# Patient Record
Sex: FEMALE | Race: BLACK OR AFRICAN AMERICAN | NOT HISPANIC OR LATINO | URBAN - METROPOLITAN AREA
[De-identification: names, ages, dates, MRNs, and addresses within clinical notes are randomized per-mention and may not be internally consistent; named-entity substitution may affect disease eponyms.]

---

## 2021-07-31 ENCOUNTER — IMPORTED ENCOUNTER (OUTPATIENT)
Dept: URBAN - METROPOLITAN AREA CLINIC 75 | Facility: CLINIC | Age: 61
End: 2021-07-31

## 2021-07-31 PROCEDURE — 99203 OFFICE O/P NEW LOW 30 MIN: CPT

## 2021-09-19 PROBLEM — H35.411 LATTICE DEGENERATION OF RETINA: Status: WELL-CONTROLLED | Noted: 2021-09-19

## 2021-09-19 PROBLEM — H35.411 LATTICE DEGENERATION OF RETINA: Noted: 2021-09-19

## 2021-09-19 PROBLEM — H25.12 NS CATARACT: Noted: 2021-09-19

## 2021-09-19 PROBLEM — H46.8 TRAUMATIC OPTIC NEUROPATHY: Noted: 2021-09-19

## 2021-09-19 PROBLEM — H43.813 POSTERIOR VITREOUS DETACHMENT (PVD): Status: ACTIVE | Noted: 2021-09-19

## 2021-09-19 PROBLEM — H33.011 RETINAL DETACHMENT WITH SINGLE BREAK: Noted: 2021-09-19

## 2021-09-19 PROBLEM — H40.043 STEROID RESPONDER: Noted: 2021-09-19

## 2021-09-19 PROBLEM — H33.002 RETINAL DETACHMENT, UNSPECIFIED: Noted: 2021-09-19

## 2021-09-19 PROBLEM — H25.12 NS CATARACT: Status: DETERIORATING | Noted: 2021-09-19

## 2021-09-19 PROBLEM — H25.13 NS CATARACT: Noted: 2021-09-19

## 2021-09-19 PROBLEM — H43.813 POSTERIOR VITREOUS DETACHMENT (PVD): Noted: 2021-09-19

## 2021-09-19 PROBLEM — H40.042 STEROID RESPONDER: Noted: 2021-09-19

## 2021-09-19 PROBLEM — H53.412 CENTRAL BLIND SPOT: Noted: 2021-09-19

## 2021-09-19 PROBLEM — H20.9 ANTERIOR UVEITIS: Noted: 2021-09-19

## 2021-09-20 ENCOUNTER — PREPPED CHART (OUTPATIENT)
Dept: URBAN - METROPOLITAN AREA CLINIC 49 | Facility: CLINIC | Age: 61
End: 2021-09-20

## 2023-10-22 ASSESSMENT — VISUAL ACUITY
OD_CC: 20/20 -1
OS_CC: CF 1FT

## 2023-10-22 ASSESSMENT — TONOMETRY
OD_IOP_MMHG: 12
OS_IOP_MMHG: 30

## 2023-11-13 ASSESSMENT — VISUAL ACUITY: OD_SC: 20/20-2

## 2023-11-13 ASSESSMENT — TONOMETRY
OS_IOP_MMHG: 20
OD_IOP_MMHG: 16

## 2023-11-27 ENCOUNTER — FOLLOW UP (OUTPATIENT)
Dept: URBAN - METROPOLITAN AREA CLINIC 49 | Facility: CLINIC | Age: 63
End: 2023-11-27

## 2023-11-27 DIAGNOSIS — H43.813: ICD-10-CM

## 2023-11-27 DIAGNOSIS — H40.1133: ICD-10-CM

## 2023-11-27 DIAGNOSIS — H35.351: ICD-10-CM

## 2023-11-27 PROCEDURE — 92134 CPTRZ OPH DX IMG PST SGM RTA: CPT

## 2023-11-27 PROCEDURE — 92014 COMPRE OPH EXAM EST PT 1/>: CPT

## 2023-11-27 PROCEDURE — 92201 OPSCPY EXTND RTA DRAW UNI/BI: CPT

## 2023-11-27 ASSESSMENT — TONOMETRY
OS_IOP_MMHG: 15
OD_IOP_MMHG: 14
OD_IOP_MMHG: 21
OS_IOP_MMHG: 17

## 2023-11-27 ASSESSMENT — VISUAL ACUITY
OD_PH: 20/30
OD_SC: 20/50

## 2024-02-22 ENCOUNTER — FOLLOW UP (OUTPATIENT)
Dept: URBAN - METROPOLITAN AREA CLINIC 49 | Facility: CLINIC | Age: 64
End: 2024-02-22

## 2024-02-22 DIAGNOSIS — H40.1133: ICD-10-CM

## 2024-02-22 DIAGNOSIS — H43.813: ICD-10-CM

## 2024-02-22 DIAGNOSIS — H33.011: ICD-10-CM

## 2024-02-22 DIAGNOSIS — H35.411: ICD-10-CM

## 2024-02-22 DIAGNOSIS — Z98.890: ICD-10-CM

## 2024-02-22 DIAGNOSIS — H35.353: ICD-10-CM

## 2024-02-22 PROCEDURE — 92202 OPSCPY EXTND ON/MAC DRAW: CPT

## 2024-02-22 PROCEDURE — 92014 COMPRE OPH EXAM EST PT 1/>: CPT

## 2024-02-22 PROCEDURE — 92134 CPTRZ OPH DX IMG PST SGM RTA: CPT

## 2024-02-22 ASSESSMENT — TONOMETRY
OD_IOP_MMHG: 14
OS_IOP_MMHG: 20

## 2024-02-22 ASSESSMENT — VISUAL ACUITY: OD_SC: 20/30-1

## 2024-04-18 ENCOUNTER — FOLLOW UP (OUTPATIENT)
Dept: URBAN - METROPOLITAN AREA CLINIC 49 | Facility: CLINIC | Age: 64
End: 2024-04-18

## 2024-04-18 DIAGNOSIS — H33.011: ICD-10-CM

## 2024-04-18 DIAGNOSIS — H43.813: ICD-10-CM

## 2024-04-18 DIAGNOSIS — H40.1133: ICD-10-CM

## 2024-04-18 DIAGNOSIS — H35.411: ICD-10-CM

## 2024-04-18 DIAGNOSIS — H35.353: ICD-10-CM

## 2024-04-18 DIAGNOSIS — Z98.890: ICD-10-CM

## 2024-04-18 PROCEDURE — 92134 CPTRZ OPH DX IMG PST SGM RTA: CPT

## 2024-04-18 PROCEDURE — 92014 COMPRE OPH EXAM EST PT 1/>: CPT

## 2024-04-18 ASSESSMENT — TONOMETRY
OD_IOP_MMHG: 8
OS_IOP_MMHG: 23

## 2024-04-18 ASSESSMENT — VISUAL ACUITY
OD_CC: 20/25-1
OD_PH: 20/20

## 2024-06-17 ENCOUNTER — COMPLETE EYE EXAM (OUTPATIENT)
Dept: URBAN - METROPOLITAN AREA CLINIC 93 | Facility: CLINIC | Age: 64
End: 2024-06-17

## 2024-06-17 DIAGNOSIS — H43.813: ICD-10-CM

## 2024-06-17 DIAGNOSIS — H52.01: ICD-10-CM

## 2024-06-17 DIAGNOSIS — H35.353: ICD-10-CM

## 2024-06-17 DIAGNOSIS — H35.411: ICD-10-CM

## 2024-06-17 DIAGNOSIS — H52.4: ICD-10-CM

## 2024-06-17 DIAGNOSIS — H40.1133: ICD-10-CM

## 2024-06-17 DIAGNOSIS — H25.12: ICD-10-CM

## 2024-06-17 PROCEDURE — 99214 OFFICE O/P EST MOD 30 MIN: CPT

## 2024-06-17 PROCEDURE — 76514 ECHO EXAM OF EYE THICKNESS: CPT

## 2024-06-17 PROCEDURE — 92015 DETERMINE REFRACTIVE STATE: CPT

## 2024-06-17 PROCEDURE — 92133 CPTRZD OPH DX IMG PST SGM ON: CPT

## 2024-06-17 ASSESSMENT — TONOMETRY
OS_IOP_MMHG: 20
OD_IOP_MMHG: 14

## 2024-06-17 ASSESSMENT — VISUAL ACUITY
OU_CC: J5
OD_SC: 20/40+2

## 2024-06-17 ASSESSMENT — PACHYMETRY
OD_CT_UM: 537
OS_CT_UM: 542

## 2024-06-24 ENCOUNTER — APPOINTMENT (RX ONLY)
Dept: URBAN - METROPOLITAN AREA CLINIC 41 | Facility: CLINIC | Age: 64
Setting detail: DERMATOLOGY
End: 2024-06-24

## 2024-06-24 DIAGNOSIS — L82.0 INFLAMED SEBORRHEIC KERATOSIS: ICD-10-CM | Status: INADEQUATELY CONTROLLED

## 2024-06-24 PROCEDURE — 17110 DESTRUCTION B9 LES UP TO 14: CPT

## 2024-06-24 PROCEDURE — ? BENIGN DESTRUCTION

## 2024-06-24 PROCEDURE — ? COUNSELING

## 2024-06-24 ASSESSMENT — LOCATION ZONE DERM: LOCATION ZONE: FACE

## 2024-06-24 ASSESSMENT — LOCATION DETAILED DESCRIPTION DERM: LOCATION DETAILED: LEFT LATERAL TEMPLE

## 2024-06-24 ASSESSMENT — LOCATION SIMPLE DESCRIPTION DERM: LOCATION SIMPLE: LEFT TEMPLE

## 2024-06-24 NOTE — PROCEDURE: BENIGN DESTRUCTION
Treatment Number (Will Not Render If 0): 0
Include Z78.9 (Other Specified Conditions Influencing Health Status) As An Associated Diagnosis?: No
Detail Level: Detailed
Post-Care Instructions: I reviewed with the patient in detail post-care instructions. Patient is to wear sunprotection, and avoid picking at any of the treated lesions. Pt may apply Vaseline to crusted or scabbing areas.
Medical Necessity Clause: This procedure was medically necessary because the lesions that were treated were:
Medical Necessity Information: It is in your best interest to select a reason for this procedure from the list below. All of these items fulfill various CMS LCD requirements except the new and changing color options.
Anesthesia Volume In Cc: 0.5
Consent: The patient's consent was obtained including but not limited to risks of crusting, scabbing, blistering, scarring, darker or lighter pigmentary change, recurrence, incomplete removal and infection.

## 2024-06-24 NOTE — HPI: SKIN LESION
Is This A New Presentation, Or A Follow-Up?: Skin Lesion
Additional History: New pt here for eval of lesion on L temple. Pt has noticed this spot enlarging and shrinking some. Pt states spot has been there for about a year. Pt denies associated symptoms. Pt noticed while undergoing chemo for breast cancer.

## (undated) RX ORDER — BROMFENAC 0.9 MG/ML: 1 SOLUTION/ DROPS OPHTHALMIC TWICE A DAY